# Patient Record
Sex: MALE | Race: WHITE | Employment: FULL TIME | URBAN - METROPOLITAN AREA
[De-identification: names, ages, dates, MRNs, and addresses within clinical notes are randomized per-mention and may not be internally consistent; named-entity substitution may affect disease eponyms.]

---

## 2019-07-30 ENCOUNTER — HOSPITAL ENCOUNTER (OUTPATIENT)
Dept: PHYSICAL THERAPY | Age: 34
Setting detail: THERAPIES SERIES
Discharge: HOME | End: 2019-07-30
Payer: COMMERCIAL

## 2019-07-30 PROCEDURE — 97161 PT EVAL LOW COMPLEX 20 MIN: CPT

## 2019-07-30 PROCEDURE — 97140 MANUAL THERAPY 1/> REGIONS: CPT

## 2019-07-30 PROCEDURE — 97110 THERAPEUTIC EXERCISES: CPT

## 2019-07-30 NOTE — PLAN OF CARE
swelling with prolonged activity, elevation and ibprofuen helps. Goal: get back to running, into the gym, sports     Relevant Medical History: none   Functional Disability Index: LEFS 65%    Pain Scale: 5/10  Easing factors: ibprofuen as needed  Provocative factors:      Type: []Constant   [x]Intermittent  []Radiating [x]Localized []other:     Numbness/Tingling: denies     Occupation/School: desk job, does elevate at work a bit     Living Status/Prior Level of Function: Independent with ADLs and IADLs    OBJECTIVE:     ROM LEFT RIGHT   Ankle PF 50 40   Ankle DF 5 0   Ankle In 50 25   Ankle Ev 10 5   Strength  LEFT RIGHT   Ankle DF  3+   Ankle PF  3+   Ankle Inv  3+   Ankle EV  3+        Circumference  Mid apex  7 cm prox             Reflexes/Sensation:    [x]Dermatomes/Myotomes intact    [x]Reflexes equal and normal bilaterally   []Other:    Joint mobility:    []Normal    [x]Hypo   []Hyper    Palpation: moderate tenderness and sensitivity to palpation dorsum of foot as well as lateral ankle     Functional Mobility/Transfers: WNL    Posture: WNL    Bandages/Dressings/Incisions: NA    Gait: (include devices/WB status) Pt ambulates NWB with crutches and high tide boot. Orthopedic Special Tests: NT                       [x] Patient history, allergies, meds reviewed. Medical chart reviewed. See intake form. Review Of Systems (ROS):  [x]Performed Review of systems (Integumentary, CardioPulmonary, Neurological) by intake and observation. Intake form has been scanned into medical record. Patient has been instructed to contact their primary care physician regarding ROS issues if not already being addressed at this time.       Co-morbidities/Complexities (which will affect course of rehabilitation):   [x]None           Arthritic conditions   []Rheumatoid arthritis (M05.9)  []Osteoarthritis (M19.91)   Cardiovascular conditions   []Hypertension (I10)  []Hyperlipidemia (E78.5)  []Angina pectoris (I20)  []Atherosclerosis (I70)   Musculoskeletal conditions   []Disc pathology   []Congenital spine pathologies   []Prior surgical intervention  []Osteoporosis (M81.8)  []Osteopenia (M85.8)   Endocrine conditions   []Hypothyroid (E03.9)  []Hyperthyroid Gastrointestinal conditions   []Constipation (C14.26)   Metabolic conditions   []Morbid obesity (E66.01)  []Diabetes type 1(E10.65) or 2 (E11.65)   []Neuropathy (G60.9)     Pulmonary conditions   []Asthma (J45)  []Coughing   []COPD (J44.9)   Psychological Disorders  []Anxiety (F41.9)  []Depression (F32.9)   []Other:   []Other:          Barriers to/and or personal factors that will affect rehab potential:              [x]Age  [x]Sex              [x]Motivation/Lack of Motivation                        []Co-Morbidities              []Cognitive Function, education/learning barriers              []Environmental, home barriers              []profession/work barriers  []past PT/medical experience  []other:  Justification:     Falls Risk Assessment (30 days):   [x] Falls Risk assessed and no intervention required.   [] Falls Risk assessed and Patient requires intervention due to being higher risk   TUG score (>12s at risk):     [] Falls education provided, including       G-Codes:     ASSESSMENT:   Functional Impairments:     []Noted lumbar/proximal hip/LE joint hypomobility   [x]Decreased LE functional ROM   [x]Decreased core/proximal hip strength and neuromuscular control   [x]Decreased LE functional strength   [x]Reduced balance/proprioceptive control   []other:      Functional Activity Limitations (from functional questionnaire and intake)   []Reduced ability to tolerate prolonged functional positions   []Reduced ability or difficulty with changes of positions or transfers between positions   []Reduced ability to maintain good posture and demonstrate good body mechanics with sitting, bending, and lifting   []Reduced ability to sleep   [] Reduced ability or tolerance with

## 2019-08-06 ENCOUNTER — HOSPITAL ENCOUNTER (OUTPATIENT)
Dept: PHYSICAL THERAPY | Age: 34
Setting detail: THERAPIES SERIES
Discharge: HOME | End: 2019-08-06
Payer: COMMERCIAL

## 2019-08-06 PROCEDURE — 97110 THERAPEUTIC EXERCISES: CPT | Performed by: SPECIALIST/TECHNOLOGIST

## 2019-08-06 PROCEDURE — 97140 MANUAL THERAPY 1/> REGIONS: CPT | Performed by: SPECIALIST/TECHNOLOGIST

## 2019-08-08 ENCOUNTER — HOSPITAL ENCOUNTER (OUTPATIENT)
Dept: PHYSICAL THERAPY | Age: 34
Setting detail: THERAPIES SERIES
Discharge: HOME | End: 2019-08-08
Payer: COMMERCIAL

## 2019-08-08 PROCEDURE — 97110 THERAPEUTIC EXERCISES: CPT | Performed by: SPECIALIST/TECHNOLOGIST

## 2019-08-08 PROCEDURE — 97140 MANUAL THERAPY 1/> REGIONS: CPT | Performed by: SPECIALIST/TECHNOLOGIST

## 2019-08-13 ENCOUNTER — HOSPITAL ENCOUNTER (OUTPATIENT)
Dept: PHYSICAL THERAPY | Age: 34
Setting detail: THERAPIES SERIES
Discharge: HOME | End: 2019-08-13
Payer: COMMERCIAL

## 2019-08-15 ENCOUNTER — APPOINTMENT (OUTPATIENT)
Dept: PHYSICAL THERAPY | Age: 34
End: 2019-08-15
Payer: COMMERCIAL

## 2019-08-20 ENCOUNTER — HOSPITAL ENCOUNTER (OUTPATIENT)
Dept: PHYSICAL THERAPY | Age: 34
Setting detail: THERAPIES SERIES
Discharge: HOME | End: 2019-08-20
Payer: COMMERCIAL

## 2019-08-20 PROCEDURE — 97110 THERAPEUTIC EXERCISES: CPT | Performed by: SPECIALIST/TECHNOLOGIST

## 2019-08-20 PROCEDURE — 97140 MANUAL THERAPY 1/> REGIONS: CPT | Performed by: SPECIALIST/TECHNOLOGIST

## 2019-08-20 PROCEDURE — 97112 NEUROMUSCULAR REEDUCATION: CPT | Performed by: SPECIALIST/TECHNOLOGIST

## 2019-08-20 NOTE — FLOWSHEET NOTE
Exercise Program:  [x] (72329) Reviewed/Progressed HEP activities related to strengthening, flexibility, endurance, ROM of core, proximal hip and LE for functional self-care, mobility, lifting and ambulation/stair navigation   [] (40751)Reviewed/Progressed HEP activities related to improving balance, coordination, kinesthetic sense, posture, motor skill, proprioception of core, proximal hip and LE for self care, mobility, lifting, and ambulation/stair navigation      Manual Treatments:  PROM / STM / Oscillations-Mobs:  G-I, II, III, IV (PA's, Inf., Post.)  [x] (33886) Provided manual therapy to mobilize LE, proximal hip and/or LS spine soft tissue/joints for the purpose of modulating pain, promoting relaxation,  increasing ROM, reducing/eliminating soft tissue swelling/inflammation/restriction, improving soft tissue extensibility and allowing for proper ROM for normal function with self care, mobility, lifting and ambulation. Modalities: Ice to go    Charges:  Timed Code Treatment Minutes: 70 min   Total Treatment Minutes: 70 min     [] EVAL  [x] NP(89827) x  2   [] IONTO  [x] NMR (77544) x  1   [] VASO  [x] Manual (76192) x  1    [] Other:  [] TA x       [] Mech Traction (34276)  [] ES(attended) (09780)      [] ES (un) (18609):     GOALS:   Patient stated goal: \"walk with no pain\"    Therapist goals for Patient:   Short Term Goals: To be achieved in: 2 weeks  1. Independent in HEP and progression per patient tolerance, in order to prevent re-injury. 2. Patient will have a decrease in pain to facilitate improvement in movement, function, and ADLs as indicated by Functional Deficits. Long Term Goals: To be achieved in: 12 weeks  1. Disability index score of 25% or less for the LEFS to assist with reaching prior level of function. 2. Patient will demonstrate increased AROM to WNL to allow for proper joint functioning as indicated by patients Functional Deficits.    3. Patient will demonstrate an increase in

## 2019-08-22 ENCOUNTER — HOSPITAL ENCOUNTER (OUTPATIENT)
Dept: PHYSICAL THERAPY | Age: 34
Setting detail: THERAPIES SERIES
Discharge: HOME | End: 2019-08-22
Payer: COMMERCIAL

## 2019-08-22 PROCEDURE — 97140 MANUAL THERAPY 1/> REGIONS: CPT

## 2019-08-22 PROCEDURE — 97112 NEUROMUSCULAR REEDUCATION: CPT

## 2019-08-22 PROCEDURE — 97110 THERAPEUTIC EXERCISES: CPT

## 2019-08-27 ENCOUNTER — HOSPITAL ENCOUNTER (OUTPATIENT)
Dept: PHYSICAL THERAPY | Age: 34
Setting detail: THERAPIES SERIES
Discharge: HOME | End: 2019-08-27
Payer: COMMERCIAL

## 2019-08-27 PROCEDURE — 97110 THERAPEUTIC EXERCISES: CPT | Performed by: SPECIALIST/TECHNOLOGIST

## 2019-08-27 PROCEDURE — 97140 MANUAL THERAPY 1/> REGIONS: CPT | Performed by: SPECIALIST/TECHNOLOGIST

## 2019-08-27 PROCEDURE — 97112 NEUROMUSCULAR REEDUCATION: CPT | Performed by: SPECIALIST/TECHNOLOGIST

## 2019-08-27 NOTE — FLOWSHEET NOTE
[facility]    Physical Therapy Daily Treatment Note  Date:  2019    Patient Name:  Sharon Ahuja    :  1985  MRN: [id number]  Restrictions/Precautions:    Medical/Treatment Diagnosis Information:  Diagnosis: R 5th metatarsal fracture   Treatment Diagnosis: R ankle stiffness  Insurance/Certification information:  PT Insurance Information: [place]/1500 deductible/80-20 cosinurance/ no visit limit   Physician Information:  Referring Practitioner: Dr. Ramos Pock of care signed (Y/N):     Date of Patient follow up with Physician:     G-Code (if applicable):      Date G-Code Applied:     LEFS 65%    Progress Note: [x]  Yes  []  No  Next due by: Visit #10       Latex Allergy:  [x]NO      []YES  Preferred Language for Healthcare:   [x]English       []other:    Visit # Insurance Allowable   5 No limit      Pain level:  2/10     Subjective:  10 min late today. Pt. walked about 1 mile at gym yesterday with some soreness after but denies any bone pain. Foot was achy after but no swelling. Had some medial knee soreness and medial arch soreness, not aware of changing walking mechanics. Dr Sekou Salas not concerned about healing in foot or PF symptoms healing well felt his strength was good. OBJECTIVE:  Observation:   Test measurements:    19 RT ankle, decreased inversion ROM and increased weakness  19 Rt foot 25.7 com dorsum of foot   Leftt foot 24cm. Moderate anatalgia FWB entering office today. slight TTP over 5th metatarsal/ peroneal tendon. Pt has a tendency to Er @  heelstrike with increased time spent on heelstrike increasing compressive force to lower compartment.  Increased visible swelling over dorsum of the foot  19  MMT RT ankle: INV 3+/5    RESTRICTIONS/PRECAUTIONS: 5th met head avulsion fx Nichelle 15    Exercises/Interventions:  Therapeutic Ex x 32 min Resistance Sets/sec Reps Notes     Ellptical  Fwd/REV  5'    Ankle ROM ok and walking with indoor track ok  strength primarily. Ok to perform gastroc strengthening 4-5x week.    [x] Continue per plan of care [] Alter current plan (see comments)  [] Plan of care initiated [] Hold pending MD visit [] Discharge    Electronically signed by: Almas Arteaga PTA, [id number]

## 2019-08-29 ENCOUNTER — HOSPITAL ENCOUNTER (OUTPATIENT)
Dept: PHYSICAL THERAPY | Age: 34
Setting detail: THERAPIES SERIES
Discharge: HOME | End: 2019-08-29
Payer: COMMERCIAL

## 2019-08-29 PROCEDURE — 97110 THERAPEUTIC EXERCISES: CPT | Performed by: SPECIALIST/TECHNOLOGIST

## 2019-08-29 PROCEDURE — 97140 MANUAL THERAPY 1/> REGIONS: CPT | Performed by: SPECIALIST/TECHNOLOGIST

## 2019-08-29 PROCEDURE — 97112 NEUROMUSCULAR REEDUCATION: CPT | Performed by: SPECIALIST/TECHNOLOGIST

## 2019-08-29 NOTE — FLOWSHEET NOTE
step  Avoid inversion mechanics       Pre's  Ankle  4# 3 10x    Clam ABD       Hip Ext /table       Bosu fwd/side lunge       Slide Lunge     Leg Press ECC    SL     Soleus press 105#  85#   40#  40#    1 25x    Cybex HS curl   ECC 30# 1 25xea    MH ABD and Ext 45# 2 15 bilat   Glute side walks Blue band 3 15 @ shins   BOSU squat  1 x25    Baps board        wallsits  SL 30\" 3x    Hamstrings ECC   25# 1 25x         Manual Intervention x 12 min       Knee mobs/PROM       Tib/Fem Mobs       Patella Mobs       Ankle mobs  12 min  LAD, A-P glides, gentle met heads 1-4   PF emphasis with                    NMR re-education x 10 min       TRX B  1 25x           G. Max Activation/prone       Hip Ext full ROM G. Activation       Bosu Bal and Prop- G Med       6\" FSU w/ airex   2 10x    4\" heel taps 3D  3 planes  20x ea    BOSU Lunge  10s 10 Fwd only   Tandem balance   step to balance fwd/side    SLS on Airex Ball toss 4#   CP/ OH  2 10x         Therapeutic Exercise and NMR EXR  [x] (44032) Provided verbal/tactile cueing for activities related to strengthening, flexibility, endurance, ROM for improvements in LE, proximal hip, and core control with self care, mobility, lifting, ambulation.  [] (46963) Provided verbal/tactile cueing for activities related to improving balance, coordination, kinesthetic sense, posture, motor skill, proprioception  to assist with LE, proximal hip, and core control in self care, mobility, lifting, ambulation and eccentric single leg control.      NMR and Therapeutic Activities:    [] (38326 or 75151) Provided verbal/tactile cueing for activities related to improving balance, coordination, kinesthetic sense, posture, motor skill, proprioception and motor activation to allow for proper function of core, proximal hip and LE with self care and ADLs  [] (79520) Gait Re-education- Provided training and instruction to the patient for proper LE, core and proximal hip recruitment and positioning and allow for proper joint functioning as indicated by patients Functional Deficits. 3. Patient will demonstrate an increase in Strength to good proximal hip strength and control to 5/5 in LE to allow for proper functional mobility as indicated by patients Functional Deficits. 4. Patient will return to all functional activities without increased symptoms or restriction. 5. Pt will ambulate with a normalized gait pattern with no AD. Progression Towards Functional goals:  [] Patient is progressing as expected towards functional goals listed. [] Progression is slowed due to complexities listed. [] Progression has been slowed due to co-morbidities. [x] Plan just implemented, too soon to assess goals progression  [] Other:     ASSESSMENT:  Pt reports mild increased soreness with program content but tolerated progressions into closed chain activity well. Has lower extremity muscle tightness especially with hips/ and hamstrings from deconditioning. Had some cramping in gastroc and fatigued noted with heel taps. Pt. has some limitation with DF ROM and great toe extension. Manual therapy focused on plantar fascia/ great toe STM and stretching/mobilizations. Pt has some increased weakness and muscular control with exercise performance. Increased tightness with hamstrings performing static stretching. Balance challenged with SLS epsecially with ball toss and lunges to bosu today. Treatment/Activity Tolerance:  [x] Patient tolerated treatment well [x] Patient limited by fatique  [] Patient limited by pain  [] Patient limited by other medical complications  [] Other:     Prognosis: [x] Good [] Fair  [] Poor    Patient Requires Follow-up: [x] Yes  [] No    PLAN: progress with gym program advised no running or jumping. Monitor soreness with icing and duration of activities. Elliptical ok and walking with indoor track ok  strength primarily.  Ok to hold gastroc strengthening due to consistent foot soreness and swelling. Advised to monitor amount of soreness with lateral foot and compressive forces to anterior ankle, continue icing and supportive footwear.    [x] Continue per plan of care [] Alter current plan (see comments)  [] Plan of care initiated [] Hold pending MD visit [] Discharge    Electronically signed by: Stella Cain PTA, [id number]

## 2019-09-05 ENCOUNTER — APPOINTMENT (OUTPATIENT)
Dept: PHYSICAL THERAPY | Age: 34
End: 2019-09-05
Payer: COMMERCIAL

## 2019-09-10 ENCOUNTER — HOSPITAL ENCOUNTER (OUTPATIENT)
Dept: PHYSICAL THERAPY | Age: 34
Setting detail: THERAPIES SERIES
Discharge: HOME | End: 2019-09-10
Payer: COMMERCIAL

## 2019-09-10 PROCEDURE — 97112 NEUROMUSCULAR REEDUCATION: CPT

## 2019-09-10 PROCEDURE — 97140 MANUAL THERAPY 1/> REGIONS: CPT

## 2019-09-10 PROCEDURE — 97110 THERAPEUTIC EXERCISES: CPT

## 2019-09-10 NOTE — FLOWSHEET NOTE
[facility]    Physical Therapy Daily Treatment Note  Date:  9/10/2019    Patient Name:  Oli Falcon    :  1985  MRN: [id number]  Restrictions/Precautions:    Medical/Treatment Diagnosis Information:  Diagnosis: R 5th metatarsal fracture   Treatment Diagnosis: R ankle stiffness  Insurance/Certification information:  PT Insurance Information: [place]/1500 deductible/80-20 cosinurance/ no visit limit   Physician Information:  Referring Practitioner: Dr. Nettie Giron of care signed (Y/N):     Date of Patient follow up with Physician: Charlotte Seo     G-Code (if applicable):      Date G-Code Applied:     LEFS 65%    Progress Note: [x]  Yes  []  No  Next due by: Visit #10       Latex Allergy:  [x]NO      []YES  Preferred Language for Healthcare:   [x]English       []other:    Visit # Insurance Allowable   7 No limit      Pain level:  2/10     Subjective: Pt reports some popping at his 5th met head that is painful at times. Pt also reports intermittent pain on the top of his foot. OBJECTIVE:  Observation:   Test measurements:    19 RT ankle, decreased inversion ROM and increased weakness  19 Rt foot 25.7 com dorsum of foot   Leftt foot 24cm. Moderate anatalgia FWB entering office today. slight TTP over 5th metatarsal/ peroneal tendon. Pt has a tendency to Er @  heelstrike with increased time spent on heelstrike increasing compressive force to lower compartment.  Increased visible swelling over dorsum of the foot  19  MMT RT ankle: INV 3+/5    RESTRICTIONS/PRECAUTIONS: 5th met head avulsion fx Nichelle 15    Exercises/Interventions:  Therapeutic Ex x 32 min Resistance Sets/sec Reps Notes     Ellptical  Fwd/REV  5'    Ankle ROM cw/ccw  2 10    tband ankle   HEP  Ankle pre's    SL ABD/ CLAMS    Supine hamstring stretch Green strap 30s 3x    incline board X2 positions    30\" x3 ea     Standing  HR/TR off step  3 10x Half foam   Pre's

## 2019-09-12 ENCOUNTER — HOSPITAL ENCOUNTER (OUTPATIENT)
Dept: PHYSICAL THERAPY | Age: 34
Setting detail: THERAPIES SERIES
Discharge: HOME | End: 2019-09-12
Payer: COMMERCIAL

## 2019-09-18 ENCOUNTER — HOSPITAL ENCOUNTER (OUTPATIENT)
Dept: PHYSICAL THERAPY | Age: 34
Setting detail: THERAPIES SERIES
Discharge: HOME | End: 2019-09-18
Payer: COMMERCIAL

## 2019-09-18 PROCEDURE — 97110 THERAPEUTIC EXERCISES: CPT | Performed by: SPECIALIST/TECHNOLOGIST

## 2019-09-18 PROCEDURE — 97140 MANUAL THERAPY 1/> REGIONS: CPT | Performed by: SPECIALIST/TECHNOLOGIST

## 2019-09-18 PROCEDURE — 97112 NEUROMUSCULAR REEDUCATION: CPT | Performed by: SPECIALIST/TECHNOLOGIST

## 2019-09-18 NOTE — FLOWSHEET NOTE
ankle   HEP  Ankle pre's    SL ABD/ CLAMS 7.5# PF/ 10# DF x 4D   Supine hamstring stretch Green strap 30s 3x    incline board X2 positions    30\" x3 ea      Half foam          Slide Lunge 20x    Leg Press ECC    SL     Soleus press 120#  85#   40#  40#    1 25x    Cybex HS curl   ECC 25xea    MH ABD/ ADD and Ext 50# 2 15 bilat   Glute side walks Blue band 3 15 @ shins   BOSU squat  1 x25    wallsits     Hamstrings ECC           Manual Intervention x 12 min       Knee mobs/PROM       Tib/Fem Mobs       Patella Mobs       Ankle mobs  12 min  LAD, A-P glides, gentle met heads 1-4   PF emphasis                    NMR re-education x 10 min       TRX B  1 25x    Rev / slider lunges  10# 2 10x    G. Max Activation/prone       Hip Ext full ROM G. Activation       SLS  3 30s Black airex    6\" FSU w/ airex      4\" heel taps 3D      BOSU Lunge  2 10x Fwd only   Tandem balance   step to balance fwd/side    SLS on Airex Ball toss 4#   CP/ OH  2 10x         Therapeutic Exercise and NMR EXR  [x] (24231) Provided verbal/tactile cueing for activities related to strengthening, flexibility, endurance, ROM for improvements in LE, proximal hip, and core control with self care, mobility, lifting, ambulation.  [] (85483) Provided verbal/tactile cueing for activities related to improving balance, coordination, kinesthetic sense, posture, motor skill, proprioception  to assist with LE, proximal hip, and core control in self care, mobility, lifting, ambulation and eccentric single leg control.      NMR and Therapeutic Activities:    [] (59462 or 74457) Provided verbal/tactile cueing for activities related to improving balance, coordination, kinesthetic sense, posture, motor skill, proprioception and motor activation to allow for proper function of core, proximal hip and LE with self care and ADLs  [] (51221) Gait Re-education- Provided training and instruction to the patient for proper LE, core and proximal hip recruitment and positioning and

## 2019-09-20 ENCOUNTER — HOSPITAL ENCOUNTER (OUTPATIENT)
Dept: PHYSICAL THERAPY | Age: 34
Setting detail: THERAPIES SERIES
Discharge: HOME | End: 2019-09-20
Payer: COMMERCIAL

## 2019-09-20 PROCEDURE — 97112 NEUROMUSCULAR REEDUCATION: CPT

## 2019-09-20 PROCEDURE — 97140 MANUAL THERAPY 1/> REGIONS: CPT

## 2019-09-20 PROCEDURE — 97110 THERAPEUTIC EXERCISES: CPT

## 2019-09-20 NOTE — FLOWSHEET NOTE
[facility]    Physical Therapy Daily Treatment Note  Date:  2019    Patient Name:  Marty Serra    :  1985  MRN: [id number]  Restrictions/Precautions:    Medical/Treatment Diagnosis Information:  Diagnosis: R 5th metatarsal fracture   Treatment Diagnosis: R ankle stiffness  Insurance/Certification information:  PT Insurance Information: [place]/1500 deductible/80-20 cosinurance/ no visit limit   Physician Information:  Referring Practitioner: Dr. Milady Florentino of care signed (Y/N):     Date of Patient follow up with Physician: Bryanna Tate     G-Code (if applicable):      Date G-Code Applied:     LEFS 65%    Progress Note: [x]  Yes  []  No  Next due by: Visit #10       Latex Allergy:  [x]NO      []YES  Preferred Language for Healthcare:   [x]English       []other:    Visit # Insurance Allowable   8 No limit      Pain level:  2/10     Subjective:   PT reports having moments of increased soreness still related to workouts here and at the gym. Continues to have some pain medial and lateral of plantar aspect of foot. OBJECTIVE:  Observation:   Test measurements:    19 RT ankle, decreased inversion ROM and increased weakness  19 Rt foot 25.7 com dorsum of foot   Leftt foot 24cm. Moderate anatalgia FWB entering office today. slight TTP over 5th metatarsal/ peroneal tendon. Pt has a tendency to Er @  heelstrike with increased time spent on heelstrike increasing compressive force to lower compartment.  Increased visible swelling over dorsum of the foot  19  MMT RT ankle: INV 3+/5    RESTRICTIONS/PRECAUTIONS: 5th met head avulsion fx Nichelle 15    Exercises/Interventions:  Therapeutic Ex x 32 min Resistance Sets/sec Reps Notes     Ellptical  /REV only  5'    Ankle ROM cw/ccw  2 10    tband ankle   HEP  Ankle pre's    SL ABD/ CLAMS 7.5# PF/ 10# DF x 4D   Supine hamstring stretch Green strap 30s 3x    incline board X2 positions    30\" x3 Strength to good proximal hip strength and control to 5/5 in LE to allow for proper functional mobility as indicated by patients Functional Deficits. 4. Patient will return to all functional activities without increased symptoms or restriction. 5. Pt will ambulate with a normalized gait pattern with no AD. Progression Towards Functional goals:  [] Patient is progressing as expected towards functional goals listed. [] Progression is slowed due to complexities listed. [] Progression has been slowed due to co-morbidities. [x] Plan just implemented, too soon to assess goals progression  [] Other:     ASSESSMENT:   Pt educated to limit amount of weight fwd lunges at the gym to protect foot at this time. Pt demonstrates difficulty maintaining balance when challenged at end of treatment session. Treatment/Activity Tolerance:  [x] Patient tolerated treatment well [x] Patient limited by fatique  [] Patient limited by pain  [] Patient limited by other medical complications  [] Other:     Prognosis: [x] Good [] Fair  [] Poor    Patient Requires Follow-up: [x] Yes  [] No    PLAN: Progress ankle strength and proprioception to return to unrestricted activities safely. [x] Continue per plan of care [] Alter current plan (see comments)  [] Plan of care initiated [] Hold pending MD visit [] Discharge    Electronically signed by:  Amrita Flores PT

## 2019-09-24 ENCOUNTER — HOSPITAL ENCOUNTER (OUTPATIENT)
Dept: PHYSICAL THERAPY | Age: 34
Setting detail: THERAPIES SERIES
Discharge: HOME | End: 2019-09-24
Payer: COMMERCIAL

## 2019-09-24 PROCEDURE — 97110 THERAPEUTIC EXERCISES: CPT | Performed by: SPECIALIST/TECHNOLOGIST

## 2019-09-24 PROCEDURE — 97112 NEUROMUSCULAR REEDUCATION: CPT | Performed by: SPECIALIST/TECHNOLOGIST

## 2019-09-24 PROCEDURE — 97140 MANUAL THERAPY 1/> REGIONS: CPT | Performed by: SPECIALIST/TECHNOLOGIST

## 2019-09-24 NOTE — FLOWSHEET NOTE
Reps Notes    Ellptical  /REV only  5'    Ankle ROM cw/ccw  2 10    tband ankle   HEP  Ankle pre's    SL ABD/ CLAMS 7.5# PF/ 10# DF x 4D   Supine hamstring stretch Green strap 30s 3x    incline board X2 positions    30\" x3 ea      Half foam          Slide Lunge 20x    Leg Press ECC    SL     Soleus press     @ gym  Last night    Supine/ standing sled calf machine 120#  85#   40#  40#   HEP   1 25x    Cybex HS curl   ECC 25xea    MH ABD/ ADD and Ext 50# 2 15 bilat   Glute side walks Blue/maroon band 3 15 @ shins   BOSU squat Water  1 x25    wallsits     Hamstrings ECC      LLLD DF silver strap  2'      Manual Intervention x 18 min       Knee mobs/PROM       Tib/Fem Mobs       Patella Mobs       Ankle mobs  12 min  LAD, A-P glides, gentle met heads 1-4   PF emphasis      LLLD DF  Silver TB with PF STM/ great toe mobility 5'             NMR re-education x 10 min       TRX   1 25x SL   Rev / slider lunges  10# 2 10x    G. Max Activation/prone       Fsu/ lsu with Left hip hike and 8# ball  NPV 2 10x    SLS  3 30s Black airex    6\" FSU w/ airex      4\" heel taps 3D      BOSU Lunge  Side step to bosu W/ 16# 2 10x Fwd only   Tandem balance   step to balance fwd/side    SLS on Airex Ball toss 4#   CP/ OH  2 10x         Therapeutic Exercise and NMR EXR  [x] (52269) Provided verbal/tactile cueing for activities related to strengthening, flexibility, endurance, ROM for improvements in LE, proximal hip, and core control with self care, mobility, lifting, ambulation.  [] (31961) Provided verbal/tactile cueing for activities related to improving balance, coordination, kinesthetic sense, posture, motor skill, proprioception  to assist with LE, proximal hip, and core control in self care, mobility, lifting, ambulation and eccentric single leg control.      NMR and Therapeutic Activities:    [] (89285 or 15987) Provided verbal/tactile cueing for activities related to improving balance, coordination, kinesthetic sense, posture, motor skill, proprioception and motor activation to allow for proper function of core, proximal hip and LE with self care and ADLs  [] (59538) Gait Re-education- Provided training and instruction to the patient for proper LE, core and proximal hip recruitment and positioning and eccentric body weight control with ambulation re-education including up and down stairs     Home Exercise Program:  [x] (71759) Reviewed/Progressed HEP activities related to strengthening, flexibility, endurance, ROM of core, proximal hip and LE for functional self-care, mobility, lifting and ambulation/stair navigation   [] (64956)Reviewed/Progressed HEP activities related to improving balance, coordination, kinesthetic sense, posture, motor skill, proprioception of core, proximal hip and LE for self care, mobility, lifting, and ambulation/stair navigation      Manual Treatments:  PROM / STM / Oscillations-Mobs:  G-I, II, III, IV (PA's, Inf., Post.)  [x] (18133) Provided manual therapy to mobilize LE, proximal hip and/or LS spine soft tissue/joints for the purpose of modulating pain, promoting relaxation,  increasing ROM, reducing/eliminating soft tissue swelling/inflammation/restriction, improving soft tissue extensibility and allowing for proper ROM for normal function with self care, mobility, lifting and ambulation. Modalities: Ice wrapped x 10 min at conclusion     Charges:  Timed Code Treatment Minutes: 54 min   Total Treatment Minutes: 54  min     [] EVAL  [x] ZF(90564) x  2   [] IONTO  [x] NMR (65955) x  1   [] VASO  [x] Manual (76331) x  1    [] Other:  [] TA x       [] [facility] (87341)  [] ES(attended) (66390)      [] ES (un) (74422):     GOALS:   Patient stated goal: \"walk with no pain\"    Therapist goals for Patient:   Short Term Goals: To be achieved in: 2 weeks  1. Independent in HEP and progression per patient tolerance, in order to prevent re-injury.    2. Patient will have a decrease in pain to facilitate improvement in movement, function, and ADLs as indicated by Functional Deficits. Long Term Goals: To be achieved in: 12 weeks  1. Disability index score of 25% or less for the LEFS to assist with reaching prior level of function. 2. Patient will demonstrate increased AROM to WNL to allow for proper joint functioning as indicated by patients Functional Deficits. 3. Patient will demonstrate an increase in Strength to good proximal hip strength and control to 5/5 in LE to allow for proper functional mobility as indicated by patients Functional Deficits. 4. Patient will return to all functional activities without increased symptoms or restriction. 5. Pt will ambulate with a normalized gait pattern with no AD. Progression Towards Functional goals:  [] Patient is progressing as expected towards functional goals listed. [] Progression is slowed due to complexities listed. [] Progression has been slowed due to co-morbidities. [x] Plan just implemented, too soon to assess goals progression  [] Other:     ASSESSMENT:   Pt educated to limit amount of weight fwd lunges at the gym to protect foot at this time. Pt demonstrates difficulty maintaining balance when challenged at end of treatment session. PT legs were tired and fatigued from starting his jogging yesterday so balance was more challenged. Treatment/Activity Tolerance:  [x] Patient tolerated treatment well [x] Patient limited by fatique  [] Patient limited by pain  [] Patient limited by other medical complications  [] Other:     Prognosis: [x] Good [] Fair  [] Poor    Patient Requires Follow-up: [x] Yes  [] No    PLAN: Progress ankle strength and proprioception to return to unrestricted activities safely. Advised not to overtax his shoulder with activities that involve more impact like running or sls to allow additional healing. Increase ice and use met pad for toe discomfort.    [x] Continue per plan of care [] Alter current plan (see comments)  [] Plan of care

## 2019-09-27 ENCOUNTER — HOSPITAL ENCOUNTER (OUTPATIENT)
Dept: PHYSICAL THERAPY | Age: 34
Setting detail: THERAPIES SERIES
Discharge: HOME | End: 2019-09-27
Payer: COMMERCIAL

## 2019-09-27 PROCEDURE — 97140 MANUAL THERAPY 1/> REGIONS: CPT | Performed by: SPECIALIST/TECHNOLOGIST

## 2019-09-27 PROCEDURE — 97110 THERAPEUTIC EXERCISES: CPT | Performed by: SPECIALIST/TECHNOLOGIST

## 2019-09-27 PROCEDURE — 97112 NEUROMUSCULAR REEDUCATION: CPT | Performed by: SPECIALIST/TECHNOLOGIST

## 2019-09-27 NOTE — FLOWSHEET NOTE
[facility]    Physical Therapy Daily Treatment Note  Date:  2019    Patient Name:  Silvina Small    :  1985  MRN: [id number]  Restrictions/Precautions:    Medical/Treatment Diagnosis Information:  Diagnosis: R 5th metatarsal fracture   Treatment Diagnosis: R ankle stiffness  Insurance/Certification information:  PT Insurance Information: [place]/1500 deductible/80-20 cosinurance/ no visit limit   Physician Information:  Referring Practitioner: Dr. Fabiola Vazquez of care signed (Y/N):     Date of Patient follow up with Physician: Lester Luong     G-Code (if applicable):      Date G-Code Applied:     LEFS 65%    Progress Note: [x]  Yes  []  No  Next due by: Visit #10       Latex Allergy:  [x]NO      []YES  Preferred Language for Healthcare:   [x]English       []other:    Visit # Insurance Allowable   9 No limit      Pain level:  2/10     Subjective:   Pt. Reports having no pain recently with walking etc. Pt. Foot feeling better, walking and jogging w/o no difficulties. OBJECTIVE:  Observation:   Test measurements:    19 RT ankle, decreased inversion ROM and increased weakness  19 Rt foot 25.7 com dorsum of foot   Leftt foot 24cm. Moderate anatalgia FWB entering office today. slight TTP over 5th metatarsal/ peroneal tendon. Pt has a tendency to Er @  heelstrike with increased time spent on heelstrike increasing compressive force to lower compartment. Increased visible swelling over dorsum of the foot  19  MMT RT ankle: INV 3+/5  19  Decreased great toe mobility and mild met head TTP between 3/4 heads  19   Rt foot  25. cm   MMT: 4+/ 5 Inversion  Mild TTP over 5th MT    RESTRICTIONS/PRECAUTIONS: 5th met head avulsion fx Nichelle 15    Exercises/Interventions:  Therapeutic Ex x 32 min Resistance Sets/sec Reps Notes    Ellptical  /REV only  5'    Ankle ROM cw/ccw  2 10    tband ankle   HEP  Ankle pre's    SL ABD/ CLAMS 10# PF/

## 2022-09-29 NOTE — FLOWSHEET NOTE
by patients Functional Deficits. 3. Patient will demonstrate an increase in Strength to good proximal hip strength and control to 5/5 in LE to allow for proper functional mobility as indicated by patients Functional Deficits. 4. Patient will return to all functional activities without increased symptoms or restriction. 5. Pt will ambulate with a normalized gait pattern with no AD. Progression Towards Functional goals:  [] Patient is progressing as expected towards functional goals listed. [] Progression is slowed due to complexities listed. [] Progression has been slowed due to co-morbidities. [x] Plan just implemented, too soon to assess goals progression  [] Other:     ASSESSMENT:  Pt reports mild increased soreness with program content but tolerated elliptical and progressions into closed chain activity well. Pt has some limitation with DF ROM. Pt has some increased weakness and muscular control with exercise performance. Increased tightness with hamstrings performing static stretching. Balance challenged with SLS. Treatment/Activity Tolerance:  [x] Patient tolerated treatment well [] Patient limited by fatique  [] Patient limited by pain  [] Patient limited by other medical complications  [] Other:     Prognosis: [x] Good [] Fair  [] Poor    Patient Requires Follow-up: [x] Yes  [] No    PLAN: progress with gym program advised no running or jumping. Monitor soreness with icing and duration of activities. Elliptical ok and walking with indoor track ok  strength primarily.    [x] Continue per plan of care [] Alter current plan (see comments)  [] Plan of care initiated [] Hold pending MD visit [] Discharge    Electronically signed by: Lili Burgos PTA
Yes, see eMAR